# Patient Record
Sex: MALE | Race: WHITE | HISPANIC OR LATINO | Employment: OTHER | URBAN - METROPOLITAN AREA
[De-identification: names, ages, dates, MRNs, and addresses within clinical notes are randomized per-mention and may not be internally consistent; named-entity substitution may affect disease eponyms.]

---

## 2018-11-29 ENCOUNTER — HOSPITAL ENCOUNTER (EMERGENCY)
Facility: MEDICAL CENTER | Age: 58
End: 2018-11-29
Attending: EMERGENCY MEDICINE
Payer: MEDICARE

## 2018-11-29 ENCOUNTER — APPOINTMENT (OUTPATIENT)
Dept: RADIOLOGY | Facility: MEDICAL CENTER | Age: 58
End: 2018-11-29
Payer: MEDICARE

## 2018-11-29 VITALS
SYSTOLIC BLOOD PRESSURE: 90 MMHG | WEIGHT: 162.26 LBS | HEIGHT: 69 IN | BODY MASS INDEX: 24.03 KG/M2 | RESPIRATION RATE: 20 BRPM | TEMPERATURE: 98.7 F | DIASTOLIC BLOOD PRESSURE: 54 MMHG | HEART RATE: 88 BPM | OXYGEN SATURATION: 95 %

## 2018-11-29 DIAGNOSIS — E87.1 HYPONATREMIA: ICD-10-CM

## 2018-11-29 DIAGNOSIS — E87.6 HYPOKALEMIA: ICD-10-CM

## 2018-11-29 DIAGNOSIS — J15.9 COMMUNITY ACQUIRED BACTERIAL PNEUMONIA: ICD-10-CM

## 2018-11-29 DIAGNOSIS — R07.89 CHEST WALL PAIN: ICD-10-CM

## 2018-11-29 LAB
ALBUMIN SERPL BCP-MCNC: 3.1 G/DL (ref 3.2–4.9)
ALBUMIN/GLOB SERPL: 1.1 G/DL
ALP SERPL-CCNC: 60 U/L (ref 30–99)
ALT SERPL-CCNC: 14 U/L (ref 2–50)
ANION GAP SERPL CALC-SCNC: 7 MMOL/L (ref 0–11.9)
AST SERPL-CCNC: 17 U/L (ref 12–45)
BASOPHILS # BLD AUTO: 0 % (ref 0–1.8)
BASOPHILS # BLD: 0 K/UL (ref 0–0.12)
BILIRUB SERPL-MCNC: 1.1 MG/DL (ref 0.1–1.5)
BUN SERPL-MCNC: 23 MG/DL (ref 8–22)
CALCIUM SERPL-MCNC: 8 MG/DL (ref 8.4–10.2)
CHLORIDE SERPL-SCNC: 96 MMOL/L (ref 96–112)
CO2 SERPL-SCNC: 25 MMOL/L (ref 20–33)
CREAT SERPL-MCNC: 1.35 MG/DL (ref 0.5–1.4)
EKG IMPRESSION: NORMAL
EOSINOPHIL # BLD AUTO: 0 K/UL (ref 0–0.51)
EOSINOPHIL NFR BLD: 0 % (ref 0–6.9)
ERYTHROCYTE [DISTWIDTH] IN BLOOD BY AUTOMATED COUNT: 38.6 FL (ref 35.9–50)
GLOBULIN SER CALC-MCNC: 2.8 G/DL (ref 1.9–3.5)
GLUCOSE SERPL-MCNC: 109 MG/DL (ref 65–99)
HCT VFR BLD AUTO: 43.1 % (ref 42–52)
HGB BLD-MCNC: 15.3 G/DL (ref 14–18)
LYMPHOCYTES # BLD AUTO: 1.42 K/UL (ref 1–4.8)
LYMPHOCYTES NFR BLD: 8 % (ref 22–41)
MANUAL DIFF BLD: ABNORMAL
MCH RBC QN AUTO: 33.6 PG (ref 27–33)
MCHC RBC AUTO-ENTMCNC: 35.5 G/DL (ref 33.7–35.3)
MCV RBC AUTO: 94.5 FL (ref 81.4–97.8)
METAMYELOCYTES NFR BLD MANUAL: 4 %
MONOCYTES # BLD AUTO: 0.53 K/UL (ref 0–0.85)
MONOCYTES NFR BLD AUTO: 3 % (ref 0–13.4)
NEUTROPHILS # BLD AUTO: 15.13 K/UL (ref 1.82–7.42)
NEUTROPHILS NFR BLD: 61 % (ref 44–72)
NEUTS BAND NFR BLD MANUAL: 24 % (ref 0–10)
NRBC # BLD AUTO: 0 K/UL
NRBC BLD-RTO: 0 /100 WBC
PLATELET # BLD AUTO: 143 K/UL (ref 164–446)
PLATELET BLD QL SMEAR: NORMAL
PMV BLD AUTO: 10.2 FL (ref 9–12.9)
POTASSIUM SERPL-SCNC: 3.1 MMOL/L (ref 3.6–5.5)
PROT SERPL-MCNC: 5.9 G/DL (ref 6–8.2)
RBC # BLD AUTO: 4.56 M/UL (ref 4.7–6.1)
RBC BLD AUTO: NORMAL
SODIUM SERPL-SCNC: 128 MMOL/L (ref 135–145)
TOXIC GRANULES BLD QL SMEAR: SLIGHT
VARIANT LYMPHS BLD QL SMEAR: NORMAL
WBC # BLD AUTO: 17.8 K/UL (ref 4.8–10.8)

## 2018-11-29 PROCEDURE — 96365 THER/PROPH/DIAG IV INF INIT: CPT

## 2018-11-29 PROCEDURE — 99285 EMERGENCY DEPT VISIT HI MDM: CPT

## 2018-11-29 PROCEDURE — 93005 ELECTROCARDIOGRAM TRACING: CPT | Performed by: EMERGENCY MEDICINE

## 2018-11-29 PROCEDURE — 700111 HCHG RX REV CODE 636 W/ 250 OVERRIDE (IP): Performed by: EMERGENCY MEDICINE

## 2018-11-29 PROCEDURE — 700105 HCHG RX REV CODE 258: Performed by: EMERGENCY MEDICINE

## 2018-11-29 PROCEDURE — 96367 TX/PROPH/DG ADDL SEQ IV INF: CPT

## 2018-11-29 PROCEDURE — 36415 COLL VENOUS BLD VENIPUNCTURE: CPT

## 2018-11-29 PROCEDURE — 80053 COMPREHEN METABOLIC PANEL: CPT

## 2018-11-29 PROCEDURE — 93005 ELECTROCARDIOGRAM TRACING: CPT

## 2018-11-29 PROCEDURE — 85027 COMPLETE CBC AUTOMATED: CPT

## 2018-11-29 PROCEDURE — 71045 X-RAY EXAM CHEST 1 VIEW: CPT

## 2018-11-29 PROCEDURE — 85007 BL SMEAR W/DIFF WBC COUNT: CPT

## 2018-11-29 RX ORDER — DOXYCYCLINE 100 MG/1
100 CAPSULE ORAL 2 TIMES DAILY
Qty: 20 CAP | Refills: 0 | Status: SHIPPED | OUTPATIENT
Start: 2018-11-29 | End: 2018-12-09

## 2018-11-29 RX ORDER — SODIUM CHLORIDE 9 MG/ML
1000 INJECTION, SOLUTION INTRAVENOUS ONCE
Status: COMPLETED | OUTPATIENT
Start: 2018-11-29 | End: 2018-11-29

## 2018-11-29 RX ORDER — POTASSIUM CHLORIDE 1.5 G/1.58G
20 POWDER, FOR SOLUTION ORAL DAILY
Qty: 30 PACKET | Refills: 0 | Status: SHIPPED | OUTPATIENT
Start: 2018-11-29 | End: 2018-12-29

## 2018-11-29 RX ADMIN — CEFTRIAXONE 1 G: 1 INJECTION, POWDER, FOR SOLUTION INTRAMUSCULAR; INTRAVENOUS at 19:51

## 2018-11-29 RX ADMIN — AZITHROMYCIN MONOHYDRATE 500 MG: 500 INJECTION, POWDER, LYOPHILIZED, FOR SOLUTION INTRAVENOUS at 20:39

## 2018-11-29 RX ADMIN — SODIUM CHLORIDE 1000 ML: 9 INJECTION, SOLUTION INTRAVENOUS at 19:51

## 2018-11-29 ASSESSMENT — PAIN SCALES - GENERAL: PAINLEVEL_OUTOF10: 8

## 2018-11-30 NOTE — ED PROVIDER NOTES
ED Provider Note    CHIEF COMPLAINT  Chief Complaint   Patient presents with   • Shortness of Breath     started last NOC   • Rib Pain     Right, denies any recent injuries or illnesses       HPI  HPI  Patient is a 58-year-old male with no past medical history presents emergency room for evaluation of right-sided chest discomfort.  Patient notes that this was on the right lower section of his chest wall, worse with deep inspiration, started last night and appears to have stayed stable during the course of the day.  He feels slightly short of breath though he has no difficulty getting deep breath.  He denies any chest pains or palpitations, no vomiting or nausea.  He has mild associated tactile fevers and chills at this time but denies any other acute illness.      REVIEW OF SYSTEMS  ROS  Constitutional: No recent illness.  Skin: No rashes   ENT: No hearing change. No rhinorrhea or nasal congestion, no ST or difficulty swallowing.  Respiratory: No SOB. No coughing or hemoptysis.  Cardiac: No palpitations, edema. No PND or orthopnea.  GI: No Abdominal Pain; N/V; diarrhea, constipation. No blood in stool.  MSK: No pain in joints or muscles. No calf pain or swelling.  Neuro: No HA or paresthesias.   Endocrine: No polyuria or polydipsia. No heat or cold intolerance.  Heme: No easy bruising. No history of bleeding disorders or anemia.    PAST MEDICAL HISTORY   has a past medical history of Bipolar disorder (HCC); ETOH abuse; Hepatitis C; Heroin use; Schizophrenia (HCC); and Suicidal ideation.    SOCIAL HISTORY  Social History     Social History Main Topics   • Smoking status: Current Every Day Smoker     Packs/day: 1.00     Years: 25.00     Types: Cigarettes   • Smokeless tobacco: Never Used   • Alcohol use Yes   • Drug use: No      Comment: h/o heroin use; quit 8/01/11   • Sexual activity: Not on file     SURGICAL HISTORY   has a past surgical history that includes other orthopedic surgery and other orthopedic  "surgery.    CURRENT MEDICATIONS  Home Medications    **Home medications have not yet been reviewed for this encounter**       ALLERGIES  No Known Allergies    PHYSICAL EXAM  VITAL SIGNS: BP (!) 90/54   Pulse 88   Temp 37.1 °C (98.7 °F) (Temporal)   Resp 20   Ht 1.753 m (5' 9\")   Wt 73.6 kg (162 lb 4.1 oz)   SpO2 95%   BMI 23.96 kg/m²  @[id number]::@  Pulse ox interpretation: I interpret this pulse ox as normal.    Physical Exam  Genl: M sitting in gurney comfortably, speaking clearly, appears in no acute distress   Head: NC/AT   ENT: Mucous membranes moist, posterior pharynx clear, uvula midline, nares patent bilaterally   Eyes: Normal sclera, pupils equal round reactive to light  Neck: Supple, FROM, no LAD appreciated   Pulmonary: Lungs are clear to auscultation bilaterally in upper fields, diminished in right lower lung fields  Chest: No TTP  CV:  RRR, no murmur appreciated, pulses 2+ in both upper and lower extremities,  Abdomen: soft, NT/ND; no rebound/guarding, no masses palpated, no HSM   : no CVA or suprapubic tenderness   Musculoskeletal: Pain free ROM of the neck. Moving upper and lower extremities and spontaneous in coordinated fashion  Neuro: A&Ox4 (person, place, time, situation), speech fluent, gait steady, no focal deficits appreciated, No cerebellar signs  Sensation is grossly intact in the distal upper and lower extremities.  5/5 strength in  and dorsiflexion/plantar flexion of the ankles  Psych: Patient has an appropriate affect and behavior  Skin: No rash or lesions.  No pallor or jaundice.  No cyanosis.  Warm and dry.     COURSE & MEDICAL DECISION MAKING  Pertinent Labs & Imaging studies reviewed. (See chart for details)    DDX:  Pneumonia  COPD exacerbation  Asthma exacerbation  CHF/ACS - less likely  Viral syndrome  Rib injury    MDM    Initial evaluation at 1910:  Patient presented the emergency room for evaluation of cough of acute etiology with primary complaints in the right " lower chest wall.  He was alert and oriented, with his isolated complaint and slightly soft blood pressures on my initial assessment.  He has been told that he has a low blood pressure at baseline but was concerned about the possibility of infectious etiology as described above and possibility of sepsis.  Patient is afebrile and without tachycardia.  Chest x-ray indicates a focal consolidation in the right lower lobe consistent with pneumonia.  Patient received IV fluids after my initial assessment for the possibility of decreased systolic blood pressure 90.  This quickly corrected with only minimal IV fluids and the patient had moderate improvement after receiving a dose of IV Rocephin and azithromycin here in the emergency department.  Patient has no risk factors healthcare acquired pneumonia, does not meet curb 65 recommendations for admission at this time based on his stable vital signs and lack of metabolic changes.  I feel the patient is a good candidate for outpatient therapy.  There is no indication of any acute coronary process in concurrence with his pneumonia.  I discussed the findings, the need for outpatient antibiotic therapy, and the importance of follow-up care.  Verbalized by the patient and his family member.    HYDRATION: Based on the patient's presentation of Hypotension the patient was given IV fluids. IV Hydration was used because oral hydration was not adequate alone. Upon recheck following hydration, the patient was improved.    The patient will not drink alcohol nor drive with prescribed medications. The patient will return for worsening symptoms and is stable at the time of discharge. The patient verbalizes understanding and will comply.      FINAL IMPRESSION  Visit Diagnoses     ICD-10-CM   1. Community acquired bacterial pneumonia J15.9   2. Chest wall pain R07.89   3. Hypokalemia E87.6   4. Hyponatremia E87.1     Electronically signed by: Franco Watkins, 11/29/2018 7:10 PM

## 2018-11-30 NOTE — ED TRIAGE NOTES
"Chief Complaint   Patient presents with   • Shortness of Breath     started last NOC   • Rib Pain     Right, denies any recent injuries or illnesses     BP (!) 90/54   Pulse 86   Temp 36.9 °C (98.5 °F) (Temporal)   Resp 20   Ht 1.753 m (5' 9\")   Wt 73.6 kg (162 lb 4.1 oz)   SpO2 96%   BMI 23.96 kg/m²     Pt currently denies c/o CP or abd pain  "

## 2018-11-30 NOTE — ED NOTES
Report received from Lamin CLOUD.  Assumed patient care. Pt assesement done.  Plan of care reviewed with patient.

## 2018-12-12 ENCOUNTER — HOSPITAL ENCOUNTER (EMERGENCY)
Facility: MEDICAL CENTER | Age: 58
End: 2018-12-12
Attending: EMERGENCY MEDICINE
Payer: MEDICARE

## 2018-12-12 ENCOUNTER — APPOINTMENT (OUTPATIENT)
Dept: RADIOLOGY | Facility: MEDICAL CENTER | Age: 58
End: 2018-12-12
Attending: EMERGENCY MEDICINE
Payer: MEDICARE

## 2018-12-12 VITALS
OXYGEN SATURATION: 90 % | HEIGHT: 69 IN | SYSTOLIC BLOOD PRESSURE: 109 MMHG | BODY MASS INDEX: 24.98 KG/M2 | TEMPERATURE: 99.8 F | DIASTOLIC BLOOD PRESSURE: 60 MMHG | RESPIRATION RATE: 18 BRPM | WEIGHT: 168.65 LBS | HEART RATE: 76 BPM

## 2018-12-12 DIAGNOSIS — R07.89 CHEST WALL PAIN: ICD-10-CM

## 2018-12-12 DIAGNOSIS — J44.1 ACUTE EXACERBATION OF CHRONIC OBSTRUCTIVE PULMONARY DISEASE (COPD) (HCC): ICD-10-CM

## 2018-12-12 LAB
ANION GAP SERPL CALC-SCNC: 6 MMOL/L (ref 0–11.9)
BASOPHILS # BLD AUTO: 0.3 % (ref 0–1.8)
BASOPHILS # BLD: 0.02 K/UL (ref 0–0.12)
BUN SERPL-MCNC: 13 MG/DL (ref 8–22)
CALCIUM SERPL-MCNC: 8.1 MG/DL (ref 8.4–10.2)
CHLORIDE SERPL-SCNC: 104 MMOL/L (ref 96–112)
CO2 SERPL-SCNC: 26 MMOL/L (ref 20–33)
CREAT SERPL-MCNC: 0.93 MG/DL (ref 0.5–1.4)
EOSINOPHIL # BLD AUTO: 0.25 K/UL (ref 0–0.51)
EOSINOPHIL NFR BLD: 3.9 % (ref 0–6.9)
ERYTHROCYTE [DISTWIDTH] IN BLOOD BY AUTOMATED COUNT: 41.1 FL (ref 35.9–50)
GLUCOSE SERPL-MCNC: 105 MG/DL (ref 65–99)
HCT VFR BLD AUTO: 41.2 % (ref 42–52)
HGB BLD-MCNC: 14.1 G/DL (ref 14–18)
IMM GRANULOCYTES # BLD AUTO: 0.02 K/UL (ref 0–0.11)
IMM GRANULOCYTES NFR BLD AUTO: 0.3 % (ref 0–0.9)
LYMPHOCYTES # BLD AUTO: 2.4 K/UL (ref 1–4.8)
LYMPHOCYTES NFR BLD: 37 % (ref 22–41)
MCH RBC QN AUTO: 33.3 PG (ref 27–33)
MCHC RBC AUTO-ENTMCNC: 34.2 G/DL (ref 33.7–35.3)
MCV RBC AUTO: 97.2 FL (ref 81.4–97.8)
MONOCYTES # BLD AUTO: 0.63 K/UL (ref 0–0.85)
MONOCYTES NFR BLD AUTO: 9.7 % (ref 0–13.4)
NEUTROPHILS # BLD AUTO: 3.17 K/UL (ref 1.82–7.42)
NEUTROPHILS NFR BLD: 48.8 % (ref 44–72)
NRBC # BLD AUTO: 0 K/UL
NRBC BLD-RTO: 0 /100 WBC
PLATELET # BLD AUTO: 294 K/UL (ref 164–446)
PMV BLD AUTO: 9.2 FL (ref 9–12.9)
POTASSIUM SERPL-SCNC: 4.1 MMOL/L (ref 3.6–5.5)
RBC # BLD AUTO: 4.24 M/UL (ref 4.7–6.1)
SODIUM SERPL-SCNC: 136 MMOL/L (ref 135–145)
WBC # BLD AUTO: 6.5 K/UL (ref 4.8–10.8)

## 2018-12-12 PROCEDURE — 99284 EMERGENCY DEPT VISIT MOD MDM: CPT

## 2018-12-12 PROCEDURE — 80048 BASIC METABOLIC PNL TOTAL CA: CPT

## 2018-12-12 PROCEDURE — 96375 TX/PRO/DX INJ NEW DRUG ADDON: CPT

## 2018-12-12 PROCEDURE — 96374 THER/PROPH/DIAG INJ IV PUSH: CPT

## 2018-12-12 PROCEDURE — 94640 AIRWAY INHALATION TREATMENT: CPT

## 2018-12-12 PROCEDURE — 700111 HCHG RX REV CODE 636 W/ 250 OVERRIDE (IP): Performed by: EMERGENCY MEDICINE

## 2018-12-12 PROCEDURE — 71046 X-RAY EXAM CHEST 2 VIEWS: CPT

## 2018-12-12 PROCEDURE — 93005 ELECTROCARDIOGRAM TRACING: CPT | Performed by: EMERGENCY MEDICINE

## 2018-12-12 PROCEDURE — 700101 HCHG RX REV CODE 250: Performed by: EMERGENCY MEDICINE

## 2018-12-12 PROCEDURE — 85025 COMPLETE CBC W/AUTO DIFF WBC: CPT

## 2018-12-12 RX ORDER — MORPHINE SULFATE 4 MG/ML
4 INJECTION, SOLUTION INTRAMUSCULAR; INTRAVENOUS ONCE
Status: COMPLETED | OUTPATIENT
Start: 2018-12-12 | End: 2018-12-12

## 2018-12-12 RX ORDER — HYDROCODONE BITARTRATE AND ACETAMINOPHEN 5; 325 MG/1; MG/1
1-2 TABLET ORAL EVERY 6 HOURS PRN
Qty: 30 TAB | Refills: 0 | Status: SHIPPED | OUTPATIENT
Start: 2018-12-12 | End: 2018-12-19

## 2018-12-12 RX ORDER — ALBUTEROL SULFATE 90 UG/1
2 AEROSOL, METERED RESPIRATORY (INHALATION) EVERY 6 HOURS PRN
Qty: 1 INHALER | Refills: 0 | Status: SHIPPED | OUTPATIENT
Start: 2018-12-12

## 2018-12-12 RX ORDER — KETOROLAC TROMETHAMINE 30 MG/ML
30 INJECTION, SOLUTION INTRAMUSCULAR; INTRAVENOUS ONCE
Status: COMPLETED | OUTPATIENT
Start: 2018-12-12 | End: 2018-12-12

## 2018-12-12 RX ORDER — ONDANSETRON 2 MG/ML
4 INJECTION INTRAMUSCULAR; INTRAVENOUS
Status: DISCONTINUED | OUTPATIENT
Start: 2018-12-12 | End: 2018-12-12 | Stop reason: HOSPADM

## 2018-12-12 RX ADMIN — ONDANSETRON 4 MG: 2 INJECTION INTRAMUSCULAR; INTRAVENOUS at 20:20

## 2018-12-12 RX ADMIN — KETOROLAC TROMETHAMINE 30 MG: 30 INJECTION, SOLUTION INTRAMUSCULAR at 20:20

## 2018-12-12 RX ADMIN — ALBUTEROL SULFATE 2.5 MG: 2.5 SOLUTION RESPIRATORY (INHALATION) at 20:29

## 2018-12-12 RX ADMIN — MORPHINE SULFATE 4 MG: 4 INJECTION INTRAVENOUS at 20:20

## 2018-12-12 ASSESSMENT — PAIN SCALES - GENERAL
PAINLEVEL_OUTOF10: 9
PAINLEVEL_OUTOF10: 9

## 2018-12-13 ENCOUNTER — PATIENT OUTREACH (OUTPATIENT)
Dept: HEALTH INFORMATION MANAGEMENT | Facility: OTHER | Age: 58
End: 2018-12-13

## 2018-12-13 LAB — EKG IMPRESSION: NORMAL

## 2018-12-13 NOTE — ED TRIAGE NOTES
Patient BIB sister for SOB and R rib pain for 2 days. Patient was diagnosed with pneumonia 2 weeks ago and he states these symptoms feel the same as then.

## 2018-12-13 NOTE — FLOWSHEET NOTE
12/12/18 2000   Events/Summary/Plan   Events/Summary/Plan SVN   Interdisciplinary Plan of Care-Goals (Indications)   Bronchodilator Indications Strong Subjective / Objective Improvement   Interdisciplinary Plan of Care-Outcomes    Bronchodilator Outcome Patient at Stable Baseline   Education   Education Yes - Pt. / Family has been Instructed in use of Respiratory Equipment;Yes - Pt. / Family has been Instructed in use of Respiratory Medications and Adverse Reactions   RT Assessment of Delivered Medications   Evaluation of Medication Delivery Daily Yes-- Pt /Family has been Instructed in use of Respiratory Medications and Adverse Reactions   SVN Group   #SVN Performed Yes   Given By: Mouthpiece   Date SVN Last Changed 12/12/18   Date SVN Next Change Due (Q 7 Days) 12/19/18   Respiratory WDL   Respiratory (WDL) X   Chest Exam   Respiration 16   Pulse 69   Heart Rate (Monitored) 68   Breath Sounds   Pre/Post Intervention Post Intervention Assessment   RUL Breath Sounds Clear   RML Breath Sounds Diminished;Rhonchi   RLL Breath Sounds Diminished   MEHNAZ Breath Sounds Clear   LLL Breath Sounds Diminished   Oximetry   Continuous Oximetry Yes   Oxygen   Home O2 Use Prior To Admission? No   Pulse Oximetry 95 %   O2 Daily Delivery Respiratory  Room Air with O2 Available

## 2018-12-13 NOTE — ED NOTES
Pt assessed. C/o right rib, shoulder pain with cough and inspiration. Recent pna, now off abx. Denies fever. Will cont to monitor

## 2018-12-13 NOTE — ED PROVIDER NOTES
ED Provider Note    CHIEF COMPLAINT  Chief Complaint   Patient presents with   • Shortness of Breath   • Rib Pain       HPI  Ravnider Loaiza is a 58 y.o. male who presents for right lateral chest pain onset yesterday.  Severe and pleuritic.  Patient was diagnosed with a right lower lobe pneumonia on November 29 completed a course of antibiotics.  His symptoms improved.  He has a 25-pack-year smoking history but denies history of COPD asthma or metered-dose inhaler use.  No diabetes or immune compromise.  He has not had an influenza or pneumonia vaccine.  Feels worse than 2 weeks ago in terms of pain and shortness of breath.  No definite fever.    REVIEW OF SYSTEMS  Pertinent positives include: Right lateral and posterior chest pain, pleuritic, dry cough, shortness of breath.  Pertinent negatives include: Headache, sore throat, rhinorrhea, DVT or PE history, leg swelling, calf pain, vomiting, diarrhea, diabetes, immunocompromise.  10+ systems reviewed and negative.      PAST MEDICAL HISTORY  Past Medical History:   Diagnosis Date   • Bipolar disorder (HCC)    • ETOH abuse    • Hepatitis C    • Heroin use     states quit 06/2011   • Schizophrenia (HCC)    • Suicidal ideation        SOCIAL HISTORY  Social History   Substance Use Topics   • Smoking status: Current Every Day Smoker     Packs/day: 1.00     Years: 25.00     Types: Cigarettes   • Smokeless tobacco: Never Used   • Alcohol use No     History   Drug Use No     Comment: h/o heroin use; quit 8/01/11       SURGICAL HISTORY  Past Surgical History:   Procedure Laterality Date   • OTHER ORTHOPEDIC SURGERY      back   • OTHER ORTHOPEDIC SURGERY      R knee       CURRENT MEDICATIONS    Current Outpatient Prescriptions   Medication Sig Dispense Refill   • potassium chloride (KLOR-CON) 20 MEQ Pack Take 1 Packet by mouth every day for 30 days. 30 Packet 0   • hydrocodone-acetaminophen (VICODIN) 5-500 MG TABS Take 1-2 Tabs by mouth every four hours as needed (pain). 20 Each  "0       ALLERGIES  No Known Allergies    PHYSICAL EXAM  VITAL SIGNS: /83   Pulse 84   Temp 36.9 °C (98.5 °F) (Temporal)   Resp 18   Ht 1.753 m (5' 9\")   Wt 76.5 kg (168 lb 10.4 oz)   SpO2 95%   BMI 24.91 kg/m²   Reviewed and normal  Constitutional: Well developed, Well nourished, quite thin, appears to be in significant pain when he moves.  HENT: Normocephalic, atraumatic, bilateral external ears normal, oropharynx moist, No exudates or erythema.   Eyes: PERRLA, conjunctiva pink, no scleral icterus.   Cardiovascular: Regular S1-S2 without murmur, rub, gallop.  Respiratory: Coarse breath sounds throughout with diminished airflow, moderate lateral chest wall tenderness reproducing symptoms.  Gastrointestinal: Soft, nontender.  Skin: No erythema, no rash.   Genitourinary:  No costovertebral angle tenderness.   Neurologic: Alert & oriented x 3, cranial nerves 2-12 intact by passive exam.  No focal deficit noted.  Psychiatric: Affect normal, Judgment normal, Mood normal.     DIFFERENTIAL DIAGNOSIS:  Pneumonia, rib fracture, pneumothorax, PE less likely.    EKG  EKG Interpretation 8:15 PM    Interpreted by me.  Indication: Chest pain    Rhythm: normal sinus   Rate: Normal at 71  Axis: normal  Ectopy: none  Conduction: normal  ST/T Waves: no acute change  Q Waves: none  R Wave progression: normal  Comparison: Unchanged    Clinical Impression: Normal sinus rhythm  .    RADIOLOGY/PROCEDURES  DX-CHEST-2 VIEWS   Final Result      Linear atelectasis/scarring in the left lower lobe with associated small focal opacity. Favor resolving/recent pneumonia rather than active pneumonia. No pleural effusions.          LABORATORY:  Results for orders placed or performed during the hospital encounter of 12/12/18   CBC WITH DIFFERENTIAL   Result Value Ref Range    WBC 6.5 4.8 - 10.8 K/uL    RBC 4.24 (L) 4.70 - 6.10 M/uL    Hemoglobin 14.1 14.0 - 18.0 g/dL    Hematocrit 41.2 (L) 42.0 - 52.0 %    MCV 97.2 81.4 - 97.8 fL    MCH " 33.3 (H) 27.0 - 33.0 pg    MCHC 34.2 33.7 - 35.3 g/dL    RDW 41.1 35.9 - 50.0 fL    Platelet Count 294 164 - 446 K/uL    MPV 9.2 9.0 - 12.9 fL    Neutrophils-Polys 48.80 44.00 - 72.00 %    Lymphocytes 37.00 22.00 - 41.00 %    Monocytes 9.70 0.00 - 13.40 %    Eosinophils 3.90 0.00 - 6.90 %    Basophils 0.30 0.00 - 1.80 %    Immature Granulocytes 0.30 0.00 - 0.90 %    Nucleated RBC 0.00 /100 WBC    Neutrophils (Absolute) 3.17 1.82 - 7.42 K/uL    Lymphs (Absolute) 2.40 1.00 - 4.80 K/uL    Monos (Absolute) 0.63 0.00 - 0.85 K/uL    Eos (Absolute) 0.25 0.00 - 0.51 K/uL    Baso (Absolute) 0.02 0.00 - 0.12 K/uL    Immature Granulocytes (abs) 0.02 0.00 - 0.11 K/uL    NRBC (Absolute) 0.00 K/uL   BASIC METABOLIC PANEL   Result Value Ref Range    Sodium 136 135 - 145 mmol/L    Potassium 4.1 3.6 - 5.5 mmol/L    Chloride 104 96 - 112 mmol/L    Co2 26 20 - 33 mmol/L    Glucose 105 (H) 65 - 99 mg/dL    Bun 13 8 - 22 mg/dL    Creatinine 0.93 0.50 - 1.40 mg/dL    Calcium 8.1 (L) 8.4 - 10.2 mg/dL    Anion Gap 6.0 0.0 - 11.9       INTERVENTIONS:  Medications   albuterol (PROVENTIL) 2.5mg/0.5ml nebulizer solution 2.5 mg (not administered)   ketorolac (TORADOL) injection 30 mg (not administered)   morphine (pf) 4 mg/ml injection 4 mg (not administered)   ondansetron (ZOFRAN) syringe/vial injection 4 mg (not administered)     Response: Improved coarse rhonchi.    COURSE & MEDICAL DECISION MAKING  Old chart reviewed and patient had thorough workup for his pneumonia, treated with Rocephin and azithromycin and discharged on oral anti-biotics.    This patient presents with right sided reproducible pleuritic chest pain after pneumonia and likely has an intercostal strain, a rib or cartilage fracture or pleuritis.  His pneumonia has resolved.  His pain is so reducible and vitals so normal the pulmonary embolism is very unlikely.  Patient also has COPD and continues to abuse tobacco..    This patient has borderline or elevated blood pressure as  recorded above and was instructed to followup with primary physician for comprehensive blood pressure evaluation and yearly fasting cholesterol assessment according to to CMS protocol.    PLAN:  Discharge Medication List as of 12/12/2018  9:21 PM      START taking these medications    Details   HYDROcodone-acetaminophen (NORCO) 5-325 MG Tab per tablet Take 1-2 Tabs by mouth every 6 hours as needed (mild pain) for up to 7 days., Disp-30 Tab, R-0, Print Rx Paper, For 7 days      albuterol 108 (90 Base) MCG/ACT Aero Soln inhalation aerosol Inhale 2 Puffs by mouth every 6 hours as needed for Shortness of Breath. You may use it up to every 4 hours but should see a physician if you need it more often than every 4 hours., Disp-1 Inhaler, R-0, Print Rx Paper           Prescription monitoring queried and score 0  Opiate risk tool utilized and patient low risk  Informed consent obtained for opiate analgesic  Indication opiate analgesic subjective chest wall pain    Discontinue tobacco use  COPD handout given  Return for worsening shortness of breath, leg swelling, fever, ill appearance    [facility] [address] [address]  [phone]  Schedule an appointment as soon as possible for a visit   As needed if not better one week      CONDITION: Stable, improved.    FINAL IMPRESSION  1. Chest wall pain    2. Acute exacerbation of chronic obstructive pulmonary disease (COPD) (HCC)    3.      Tobacco abuse  4.      Resolved pneumonia    Electronically signed by: Jay Gilliam, 12/12/2018 8:13 PM

## 2018-12-13 NOTE — LETTER
Ravinder Loaiza  [address] [place] [address]    December 13, 2018      Dear Ravinder Loaiza,    [facility], [facility] wants to ensure your discharge home is safe and you or your loved ones have had all of your questions answered regarding your care after you leave the hospital.    Our discharge team was unsuccessful in our attempts to contact you telephonically and we wanted to be sure that you had a list of resources and contact information should you have any questions regarding your hospital stay, follow-up instructions, or active medical symptoms.    Questions or Concerns Regarding… Contact   Medical Questions Related to Your Discharge  (7 days a week, 8am-5pm) Contact a Nurse Care Coordinator   [phone]   Medical Questions Not Related to Your Discharge  (24 hours a day / 7 days a week)  Contact the Nurse Health Line   [phone]    Medications or Discharge Instructions Refer to your discharge packet   or contact your -[phone]   Follow-up Appointment(s) Schedule your appointment via Eda[employer]   or contact Scheduling [phone]   Billing Review your statement via Edai  or contact Billing [phone]   Medical Records Review your records via Edai   or contact Medical Records [phone]     You can also easily access your medical information, test results and upcoming appointments via the Edai free online health management tool. You can learn more and sign up at [employer]/Edai. For assistance setting up your Edai account, please call [phone].    Once again, we want to ensure your discharge home is safe and that you have a clear understanding of any next steps in your care. If you have any questions or concerns, please do not hesitate to contact us, we are here for you. Thank you for choosing [facility] for your healthcare needs.    Sincerely,      Your [facility] Healthcare Team

## 2018-12-13 NOTE — DISCHARGE INSTRUCTIONS
Chronic Obstructive Pulmonary Disease  (COPD)    Use albuterol 4 times a day for cough or shortness of breath.  Stop smoking tobacco.  Take ibuprofen 800 mg 4 times a day for 5-10 days unless it upsets the stomach.  Add hydrocodone for more severe pain.  See your doctor if not better in a week.  Return for worsening shortness of breath, leg swelling, fever or ill appearance.  Return also for ill appearance or worsening shortness of breath.   You had an elevated or high normal blood pressure reading today.  This does not necessarily mean you have hypertension.  Please followup with your/a primary physician for comprehensive blood pressure evaluation.  BP Readings from Last 3 Encounters:   12/12/18 119/83   11/29/18 (!) 90/54   05/14/12 134/96   .    Your physician has diagnosed you as having chronic obstructive pulmonary disease (COPD). This is a process in which the air flow is restricted from leaving the lungs. It is also an end stage process of previous damage with the most common cause being smoking. It is essentially irreversible and treatment is mainly supportive. The lungs can never return to normal, but there are measures you can take which will improve them and make you feel better.    HOME CARE INSTRUCTIONS  If you smoke, STOP SMOKING. The carbon monoxide build-up in the blood robs you of your already short oxygen supply.  If antibiotics were prescribed, take as directed.  Avoid antihistamines and cough syrups. (They dry your system up and slow down the elimination of secretions. This decreases respiratory capacity and may lead to infections.)  Drink 8 large glasses of fluids per day. (This loosens secretions.)  Use humidifiers in home and at bedside if they do not make breathing difficult.  Receive all protective vaccines your caregiver suggests, especially pneumococcal and influenza.  Use home oxygen as suggested once started.    SEEK MEDICAL ATTENTION IF:  Sputum becomes purulent (infected looking).  An  oral temperature that lasts 2 or more days above 102° F (38.9° C) or as your caregiver suggests, and is not controlled by medication.  Breathing is more labored or exercise tolerance is decreasing.  You are running out of medicine you take for your breathing.    SEEK IMMEDIATE MEDICAL CARE IF:  You have rapid heart rate, agitation, confusion or stupor (family members may need to observe this), or tremors.  Any time it becomes difficult to breathe.  You develop chest pain.    Document Released: 09/27/2006  Document Re-Released: 10/04/2007  [employer]® Patient Information ©2008 [employer], [employer].

## 2018-12-13 NOTE — ED NOTES
Discharge information provided. Pt verbalized understanding of discharge instructions to follow up with PCP and to return to ER if condition worsens. Pt expressed the awareness of not driving or operating heavy machinery, has ride home with Wife. Pt ambulated out of ER in a steady gait, no additional questions or concerns. Educated on new medications and spacer use

## 2018-12-14 NOTE — PROGRESS NOTES
Placed discharge outreach phone call to pt s/p ER discharge 12/12/18.  Received recording stating that pt's voice mailbox is full.  Discharge outreach letter mailed to pt.

## 2019-01-31 ENCOUNTER — HOSPITAL ENCOUNTER (EMERGENCY)
Facility: MEDICAL CENTER | Age: 59
End: 2019-01-31
Payer: MEDICARE

## 2019-01-31 VITALS
RESPIRATION RATE: 20 BRPM | HEART RATE: 87 BPM | BODY MASS INDEX: 24.91 KG/M2 | HEIGHT: 69 IN | TEMPERATURE: 97.8 F | DIASTOLIC BLOOD PRESSURE: 72 MMHG | OXYGEN SATURATION: 98 % | SYSTOLIC BLOOD PRESSURE: 119 MMHG

## 2019-01-31 PROCEDURE — 302449 STATCHG TRIAGE ONLY (STATISTIC)

## 2022-01-31 ENCOUNTER — HOSPITAL ENCOUNTER (EMERGENCY)
Facility: MEDICAL CENTER | Age: 62
End: 2022-02-01
Attending: EMERGENCY MEDICINE
Payer: MEDICARE

## 2022-01-31 ENCOUNTER — APPOINTMENT (OUTPATIENT)
Dept: RADIOLOGY | Facility: MEDICAL CENTER | Age: 62
End: 2022-01-31
Attending: EMERGENCY MEDICINE
Payer: MEDICARE

## 2022-01-31 DIAGNOSIS — R41.82 ALTERED MENTAL STATUS, UNSPECIFIED ALTERED MENTAL STATUS TYPE: ICD-10-CM

## 2022-01-31 DIAGNOSIS — F10.929 ALCOHOLIC INTOXICATION WITH COMPLICATION (HCC): ICD-10-CM

## 2022-01-31 DIAGNOSIS — U07.1 COVID-19: ICD-10-CM

## 2022-01-31 LAB
BASOPHILS # BLD AUTO: 0.3 % (ref 0–1.8)
BASOPHILS # BLD: 0.02 K/UL (ref 0–0.12)
EOSINOPHIL # BLD AUTO: 0.25 K/UL (ref 0–0.51)
EOSINOPHIL NFR BLD: 4.1 % (ref 0–6.9)
ERYTHROCYTE [DISTWIDTH] IN BLOOD BY AUTOMATED COUNT: 42.8 FL (ref 35.9–50)
HCT VFR BLD AUTO: 42.9 % (ref 42–52)
HGB BLD-MCNC: 14.8 G/DL (ref 14–18)
IMM GRANULOCYTES # BLD AUTO: 0.04 K/UL (ref 0–0.11)
IMM GRANULOCYTES NFR BLD AUTO: 0.7 % (ref 0–0.9)
LYMPHOCYTES # BLD AUTO: 2.58 K/UL (ref 1–4.8)
LYMPHOCYTES NFR BLD: 42.5 % (ref 22–41)
MCH RBC QN AUTO: 33.5 PG (ref 27–33)
MCHC RBC AUTO-ENTMCNC: 34.5 G/DL (ref 33.7–35.3)
MCV RBC AUTO: 97.1 FL (ref 81.4–97.8)
MONOCYTES # BLD AUTO: 0.63 K/UL (ref 0–0.85)
MONOCYTES NFR BLD AUTO: 10.4 % (ref 0–13.4)
NEUTROPHILS # BLD AUTO: 2.55 K/UL (ref 1.82–7.42)
NEUTROPHILS NFR BLD: 42 % (ref 44–72)
NRBC # BLD AUTO: 0 K/UL
NRBC BLD-RTO: 0 /100 WBC
PLATELET # BLD AUTO: 257 K/UL (ref 164–446)
PMV BLD AUTO: 9.7 FL (ref 9–12.9)
RBC # BLD AUTO: 4.42 M/UL (ref 4.7–6.1)
WBC # BLD AUTO: 6.1 K/UL (ref 4.8–10.8)

## 2022-01-31 PROCEDURE — 700111 HCHG RX REV CODE 636 W/ 250 OVERRIDE (IP): Performed by: EMERGENCY MEDICINE

## 2022-01-31 PROCEDURE — 80053 COMPREHEN METABOLIC PANEL: CPT

## 2022-01-31 PROCEDURE — 82077 ASSAY SPEC XCP UR&BREATH IA: CPT

## 2022-01-31 PROCEDURE — 85025 COMPLETE CBC W/AUTO DIFF WBC: CPT

## 2022-01-31 PROCEDURE — 36415 COLL VENOUS BLD VENIPUNCTURE: CPT

## 2022-01-31 PROCEDURE — 99285 EMERGENCY DEPT VISIT HI MDM: CPT

## 2022-01-31 PROCEDURE — 93005 ELECTROCARDIOGRAM TRACING: CPT | Performed by: EMERGENCY MEDICINE

## 2022-01-31 PROCEDURE — 84484 ASSAY OF TROPONIN QUANT: CPT

## 2022-01-31 PROCEDURE — 96372 THER/PROPH/DIAG INJ SC/IM: CPT

## 2022-01-31 RX ORDER — LORAZEPAM 2 MG/ML
1 INJECTION INTRAMUSCULAR ONCE
Status: COMPLETED | OUTPATIENT
Start: 2022-01-31 | End: 2022-01-31

## 2022-01-31 RX ORDER — HALOPERIDOL 5 MG/ML
5 INJECTION INTRAMUSCULAR ONCE
Status: COMPLETED | OUTPATIENT
Start: 2022-01-31 | End: 2022-01-31

## 2022-01-31 RX ADMIN — LORAZEPAM 1 MG: 2 INJECTION INTRAMUSCULAR; INTRAVENOUS at 23:15

## 2022-01-31 RX ADMIN — HALOPERIDOL LACTATE 5 MG: 5 INJECTION, SOLUTION INTRAMUSCULAR at 23:14

## 2022-01-31 ASSESSMENT — FIBROSIS 4 INDEX: FIB4 SCORE: 0.96

## 2022-02-01 ENCOUNTER — APPOINTMENT (OUTPATIENT)
Dept: RADIOLOGY | Facility: MEDICAL CENTER | Age: 62
End: 2022-02-01
Attending: EMERGENCY MEDICINE
Payer: MEDICARE

## 2022-02-01 VITALS
OXYGEN SATURATION: 100 % | SYSTOLIC BLOOD PRESSURE: 104 MMHG | RESPIRATION RATE: 18 BRPM | WEIGHT: 168 LBS | BODY MASS INDEX: 24.81 KG/M2 | HEART RATE: 77 BPM | DIASTOLIC BLOOD PRESSURE: 67 MMHG | TEMPERATURE: 97.3 F

## 2022-02-01 LAB
ALBUMIN SERPL BCP-MCNC: 4.1 G/DL (ref 3.2–4.9)
ALBUMIN/GLOB SERPL: 1.6 G/DL
ALP SERPL-CCNC: 119 U/L (ref 30–99)
ALT SERPL-CCNC: 30 U/L (ref 2–50)
ANION GAP SERPL CALC-SCNC: 14 MMOL/L (ref 7–16)
AST SERPL-CCNC: 23 U/L (ref 12–45)
BILIRUB SERPL-MCNC: <0.2 MG/DL (ref 0.1–1.5)
BUN SERPL-MCNC: 14 MG/DL (ref 8–22)
CALCIUM SERPL-MCNC: 8.2 MG/DL (ref 8.4–10.2)
CHLORIDE SERPL-SCNC: 111 MMOL/L (ref 96–112)
CO2 SERPL-SCNC: 19 MMOL/L (ref 20–33)
CREAT SERPL-MCNC: 0.68 MG/DL (ref 0.5–1.4)
EKG IMPRESSION: NORMAL
ETHANOL BLD-MCNC: 189.6 MG/DL (ref 0–10)
FLUAV RNA SPEC QL NAA+PROBE: NEGATIVE
FLUBV RNA SPEC QL NAA+PROBE: NEGATIVE
GLOBULIN SER CALC-MCNC: 2.6 G/DL (ref 1.9–3.5)
GLUCOSE SERPL-MCNC: 107 MG/DL (ref 65–99)
POTASSIUM SERPL-SCNC: 3.8 MMOL/L (ref 3.6–5.5)
PROT SERPL-MCNC: 6.7 G/DL (ref 6–8.2)
RSV RNA SPEC QL NAA+PROBE: NEGATIVE
SARS-COV-2 RNA RESP QL NAA+PROBE: DETECTED
SODIUM SERPL-SCNC: 144 MMOL/L (ref 135–145)
SPECIMEN SOURCE: ABNORMAL
TROPONIN T SERPL-MCNC: <6 NG/L (ref 6–19)

## 2022-02-01 PROCEDURE — 71045 X-RAY EXAM CHEST 1 VIEW: CPT

## 2022-02-01 PROCEDURE — C9803 HOPD COVID-19 SPEC COLLECT: HCPCS | Performed by: EMERGENCY MEDICINE

## 2022-02-01 PROCEDURE — 0241U HCHG SARS-COV-2 COVID-19 NFCT DS RESP RNA 4 TRGT MIC: CPT

## 2022-02-01 NOTE — ED NOTES
Security at bedside to adjust the patient arms in restraints. Pt slightly agitated upon awakening and trying to leave. Restraints continued for continued agitation.

## 2022-02-01 NOTE — ED NOTES
Spoke with patient's sister Nicolasa to update her on patient's discharge. She reports she will come and  the patient.

## 2022-02-01 NOTE — ED PROVIDER NOTES
ED Provider Note    Patient:SEVEN Castellon  Patient : 1960  Patient MRN: [id number]  Patient PCP: Pcp Pt States None    Admit Date: 2022  Discharge Date and Time: 22 6:28 AM  Discharge Diagnosis: COVID-19, alcohol intoxication  Discharge Attending: Von Singh M.D.  Discharge Service: ED Observation    ED Course  SEVEN Castellon is a 61 y.o. male who was evaluated at [facility] for complaints of chest pain as well as URI symptoms.  Known exposure to COVID-19.  He is not vaccinated.  He was evaluated last night.  Identified to have COVID-19 with reassuring cardiac evaluation.  He was intoxicated and became agitated and combative.  He required chemical and physical restraints for his protection and that of others.  He was allowed to sober up overnight.  I reassessed the patient this morning.  He is calm cooperative.  He is not suicidal.  He does not remember what happened last night and he is apologetic for what happened.  He was informed of his Covid diagnosis.  He is not interested in any additional treatment.  He lives with his sister and reports they had already had Covid.  His vital signs are stable.  He does not have any findings of decompensation of his Covid.  He will be discharged to follow-up with his primary provider.  I have advised him to return to the ER if he has difficulty breathing, worsening symptoms, not improving or concern    Discharge Exam:  /67   Pulse 77   Temp 36.3 °C (97.3 °F) (Temporal)   Resp 18   Wt 76.2 kg (168 lb)   SpO2 100%   BMI 24.81 kg/m² .    Constitutional: Awake and alert. Nontoxic  HENT: Nasal congestion noted  Eyes: Grossly normal  Neck: Normal range of motion  Cardiovascular: Normal heart rate   Thorax & Lungs: No respiratory distress  Abdomen: Nontender  Skin:  No pathologic rash.   Extremities: Well perfused  Psychiatric: Affect normal.  Pleasant cooperative    Labs  Results for orders placed or performed during the hospital  encounter of 01/31/22   CBC with Differential   Result Value Ref Range    WBC 6.1 4.8 - 10.8 K/uL    RBC 4.42 (L) 4.70 - 6.10 M/uL    Hemoglobin 14.8 14.0 - 18.0 g/dL    Hematocrit 42.9 42.0 - 52.0 %    MCV 97.1 81.4 - 97.8 fL    MCH 33.5 (H) 27.0 - 33.0 pg    MCHC 34.5 33.7 - 35.3 g/dL    RDW 42.8 35.9 - 50.0 fL    Platelet Count 257 164 - 446 K/uL    MPV 9.7 9.0 - 12.9 fL    Neutrophils-Polys 42.00 (L) 44.00 - 72.00 %    Lymphocytes 42.50 (H) 22.00 - 41.00 %    Monocytes 10.40 0.00 - 13.40 %    Eosinophils 4.10 0.00 - 6.90 %    Basophils 0.30 0.00 - 1.80 %    Immature Granulocytes 0.70 0.00 - 0.90 %    Nucleated RBC 0.00 /100 WBC    Neutrophils (Absolute) 2.55 1.82 - 7.42 K/uL    Lymphs (Absolute) 2.58 1.00 - 4.80 K/uL    Monos (Absolute) 0.63 0.00 - 0.85 K/uL    Eos (Absolute) 0.25 0.00 - 0.51 K/uL    Baso (Absolute) 0.02 0.00 - 0.12 K/uL    Immature Granulocytes (abs) 0.04 0.00 - 0.11 K/uL    NRBC (Absolute) 0.00 K/uL   Complete Metabolic Panel (CMP)   Result Value Ref Range    Sodium 144 135 - 145 mmol/L    Potassium 3.8 3.6 - 5.5 mmol/L    Chloride 111 96 - 112 mmol/L    Co2 19 (L) 20 - 33 mmol/L    Anion Gap 14.0 7.0 - 16.0    Glucose 107 (H) 65 - 99 mg/dL    Bun 14 8 - 22 mg/dL    Creatinine 0.68 0.50 - 1.40 mg/dL    Calcium 8.2 (L) 8.4 - 10.2 mg/dL    AST(SGOT) 23 12 - 45 U/L    ALT(SGPT) 30 2 - 50 U/L    Alkaline Phosphatase 119 (H) 30 - 99 U/L    Total Bilirubin <0.2 0.1 - 1.5 mg/dL    Albumin 4.1 3.2 - 4.9 g/dL    Total Protein 6.7 6.0 - 8.2 g/dL    Globulin 2.6 1.9 - 3.5 g/dL    A-G Ratio 1.6 g/dL   Troponin   Result Value Ref Range    Troponin T <6 6 - 19 ng/L   ETHYL ALCOHOL (BLOOD)   Result Value Ref Range    Diagnostic Alcohol 189.6 (H) 0.0 - 10.0 mg/dL   ESTIMATED GFR   Result Value Ref Range    GFR If African American >60 >60 mL/min/1.73 m 2    GFR If Non African American >60 >60 mL/min/1.73 m 2   COV-2, FLU A/B, AND RSV BY PCR (2-4 HOURS [employer]): Collect NP swab in VTM    Specimen: Respirate    Result Value Ref Range    Influenza virus A RNA Negative Negative    Influenza virus B, PCR Negative Negative    RSV, PCR Negative Negative    SARS-CoV-2 by PCR DETECTED (AA)     SARS-CoV-2 Source NP Swab    EKG   Result Value Ref Range    Report       [facility] Emergency Dept.    Test Date:  2022  Pt Name:    SEVEN BARRETT            Department: [facility]  MRN:        [id number]                      [facility]  Gender:     Male                         Technician: DANA  :        1960                   Requested By:ER TRIAGE PROTOCOL  Order #:    [id number]                    Reading MD: JENIFER WHEELER MD    Measurements  Intervals                                Axis  Rate:       86                           P:          9  UT:         176                          QRS:        84  QRSD:       97                           T:          57  QT:         375  QTc:        449    Interpretive Statements  Sinus rhythm  Compared to ECG 2018 19:25:42  No significant changes  Electronically Signed On 2022 0:35:56 PST by JENIFER WHEELER MD         Radiology  DX-CHEST-PORTABLE (1 VIEW)   Final Result         1.  Left atelectasis or early infiltrate.          Disposition: Home    Discharge Condition: Stable    Electronically signed by: Von Singh M.D., 2022 6:28 AM

## 2022-02-01 NOTE — ED NOTES
2245: pt arrived via [facility] and became aggressive with staff. Security notified at arrived at bedside. The pt was restrained by security and moved onto [facility]. Pt calmed down and agreed to be cooperative with staff.   2250; Dr Craig to bedside to assess patient.   2300: This RN attempted to insert IV, obtain vitals, and get and EKG. Pt became aggressing and violent with staff. RN and tach attempted to calm patient down. Security notified and patient was placed in 4 point hard restraints per Dr Craig orders. IM Haldol and Ativan given per Dr Craig.   2315: Pts sister arrived at bedside and tried to calm pt down. Updated sister and patient on POC.   2350: Pt is calm and sleeping with sister at bedside. EKG obtained, IV placed and labs collected.

## 2022-02-01 NOTE — ED PROVIDER NOTES
"ED Provider Note    Scribed for Dr. Sarkis Craig M.D. by Pako Sky. 1/31/2022  10:55 PM    Primary care provider: Pcp Pt States None  Means of arrival: EMS  History obtained from: Patient  History limited by: Intoxication    CHIEF COMPLAINT  Chief Complaint   Patient presents with   • Chest Pain     pt report the chest pain started this afternoon. He reports \"it felt like an elephant was sitting on my chest\"   • Alcohol Intoxication     pt report he drank '2 shots of Rum\"       HPI  SEVEN Castellon is a 61 y.o. male who presents to the Emergency Department via EMS for evaluation of an episode of chest pain occurring this afternoon. Per nursing, the patient described the chest pain as \"An elephant sitting on my chest\". He reports that his chest pain has resolved upon arriving to the ED according to his sister who arrived later he had an episode in which he was complaining of shortness of breath and banging on the floor.  He affirms associated cough, but denies any fever. He notes that he does not have any prior history of similar chest pain. The patient states he had a face-to-face interaction with a friend who had recently tested positive for COVID-19. The patient reports smoking two packs of cigarettes per day. HPI Limited secondary to Intoxication.    REVIEW OF SYSTEMS  Pertinent positives include crushing chest pain and cough.   Pertinent negatives include no fevers.  See HPI for further details.   ROS Limited secondary to Intoxication.    PAST MEDICAL HISTORY   has a past medical history of Bipolar disorder (HCC), ETOH abuse, Hepatitis C, Heroin use, Schizophrenia (HCC), and Suicidal ideation.    SURGICAL HISTORY   has a past surgical history that includes other orthopedic surgery and other orthopedic surgery.    SOCIAL HISTORY  Social History     Tobacco Use   • Smoking status: Current Every Day Smoker     Packs/day: 1.00     Years: 25.00     Pack years: 25.00     Types: Cigarettes   • Smokeless " tobacco: Never Used   Substance Use Topics   • Alcohol use: No   • Drug use: No     Types: Intravenous     Comment: h/o heroin use; quit 8/01/11      Social History     Substance and Sexual Activity   Drug Use No   • Types: Intravenous    Comment: h/o heroin use; quit 8/01/11       FAMILY HISTORY  Cannot be obtained uncooperative    CURRENT MEDICATIONS  Home Medications    **Home medications have not yet been reviewed for this encounter**         ALLERGIES  No Known Allergies    PHYSICAL EXAM  VITAL SIGNS: /75   Pulse 78   SpO2 89%     Constitutional: Appears intoxicated, Well developed, Well nourished, initially cooperative but then became agitated.  Yelling loudly no respiratory distress  HENT: Normocephalic, Atraumatic, Bilateral external ears normal, Oropharynx moist, No oral exudates.   Eyes: PERRLA, EOMI, Conjunctiva normal, No discharge.   Neck: No tenderness, Supple, No stridor.   Lymphatic: No lymphadenopathy noted.   Cardiovascular: Normal heart rate, Normal rhythm.   Thorax & Lungs: Mild chest tenderness, Clear to auscultation bilaterally, difficult to auscultate and assess completely as he is yelling and uncooperative appears clear later  ,  .   Abdomen: Soft, No tenderness, No masses, No pulsatile masses.   Skin: Warm, Dry, No erythema, No rash.   Extremities:, No edema No cyanosis.   Musculoskeletal: No major deformities noted.  Intact distal pulses  Neurologic: Awake, alert. Moves all extremities spontaneously.     LABS  Results for orders placed or performed during the hospital encounter of 01/31/22   CBC with Differential   Result Value Ref Range    WBC 6.1 4.8 - 10.8 K/uL    RBC 4.42 (L) 4.70 - 6.10 M/uL    Hemoglobin 14.8 14.0 - 18.0 g/dL    Hematocrit 42.9 42.0 - 52.0 %    MCV 97.1 81.4 - 97.8 fL    MCH 33.5 (H) 27.0 - 33.0 pg    MCHC 34.5 33.7 - 35.3 g/dL    RDW 42.8 35.9 - 50.0 fL    Platelet Count 257 164 - 446 K/uL    MPV 9.7 9.0 - 12.9 fL    Neutrophils-Polys 42.00 (L) 44.00 - 72.00  %    Lymphocytes 42.50 (H) 22.00 - 41.00 %    Monocytes 10.40 0.00 - 13.40 %    Eosinophils 4.10 0.00 - 6.90 %    Basophils 0.30 0.00 - 1.80 %    Immature Granulocytes 0.70 0.00 - 0.90 %    Nucleated RBC 0.00 /100 WBC    Neutrophils (Absolute) 2.55 1.82 - 7.42 K/uL    Lymphs (Absolute) 2.58 1.00 - 4.80 K/uL    Monos (Absolute) 0.63 0.00 - 0.85 K/uL    Eos (Absolute) 0.25 0.00 - 0.51 K/uL    Baso (Absolute) 0.02 0.00 - 0.12 K/uL    Immature Granulocytes (abs) 0.04 0.00 - 0.11 K/uL    NRBC (Absolute) 0.00 K/uL   Complete Metabolic Panel (CMP)   Result Value Ref Range    Sodium 144 135 - 145 mmol/L    Potassium 3.8 3.6 - 5.5 mmol/L    Chloride 111 96 - 112 mmol/L    Co2 19 (L) 20 - 33 mmol/L    Anion Gap 14.0 7.0 - 16.0    Glucose 107 (H) 65 - 99 mg/dL    Bun 14 8 - 22 mg/dL    Creatinine 0.68 0.50 - 1.40 mg/dL    Calcium 8.2 (L) 8.4 - 10.2 mg/dL    AST(SGOT) 23 12 - 45 U/L    ALT(SGPT) 30 2 - 50 U/L    Alkaline Phosphatase 119 (H) 30 - 99 U/L    Total Bilirubin <0.2 0.1 - 1.5 mg/dL    Albumin 4.1 3.2 - 4.9 g/dL    Total Protein 6.7 6.0 - 8.2 g/dL    Globulin 2.6 1.9 - 3.5 g/dL    A-G Ratio 1.6 g/dL   Troponin   Result Value Ref Range    Troponin T <6 6 - 19 ng/L   ETHYL ALCOHOL (BLOOD)   Result Value Ref Range    Diagnostic Alcohol 189.6 (H) 0.0 - 10.0 mg/dL   ESTIMATED GFR   Result Value Ref Range    GFR If African American >60 >60 mL/min/1.73 m 2    GFR If Non African American >60 >60 mL/min/1.73 m 2   EKG   Result Value Ref Range    Report       [facility] Emergency Dept.    Test Date:  2022  Pt Name:    SEVEN BARRETT            Department: [facility]  MRN:        [id number]                      Room:       -ROOM 1  Gender:     Male                         Technician: DANA  :        1960                   Requested By:ER TRIAGE PROTOCOL  Order #:    [id number]                    Reading MD: JENIFER WHEELER MD    Measurements  Intervals                                Axis  Rate:        86                           P:          9  MN:         176                          QRS:        84  QRSD:       97                           T:          57  QT:         375  QTc:        449    Interpretive Statements  Sinus rhythm  Compared to ECG 12/12/2018 19:25:42  No significant changes  Electronically Signed On 2-1-2022 0:35:56 PST by JENIFER WHEELER MD        All labs reviewed by me.    EKG  Interpreted by me, as described below.    RADIOLOGY  DX-CHEST-PORTABLE (1 VIEW)    (Results Pending)     The radiologist's interpretation of all radiological studies have been reviewed by me.    COURSE & MEDICAL DECISION MAKING  Pertinent Labs & Imaging studies reviewed. (See chart for details)    10:55 PM - Patient seen and examined at bedside. Ordered DX-Chest, CBC w/diff, CMP, Troponin, and EKG to evaluate his symptoms. The differential diagnoses include but are not limited to: Acute coronary syndrome, Covid, COPD exacerbation, chest wall pain    11:06 PM - Patient yelling, called over to bedside. He will be put in restraints by security at bedside.    11:42 PM - Patient seen at bedside.    Decision Making:  This is a   61-year-old male brought in by ambulance after an episode of chest pain.  He had stated this completely resolved before he was seen.  He became agitated and aggressive in the emergency room required restraints and was treated with Haldol and Ativan.  Prior to this he I was able to examine him he did have some mild chest wall tenderness but otherwise normal and was no longer complaining of any pain.  He did have a chest pain weight work-up instituted including EKG which appears normal to me normal labs including troponin.  Chest x-ray still pending at this time.  The patient is quite sedated from the Haldol and Ativan and I cannot able to wake him up at this point I will need past care on to another ER physician at this point until this sedation wears off  Patient will be admitted to ED observation at  this point 12:50 AM on 2/1/2022  FINAL IMPRESSION  1. Chest pain, unspecified type          I, Pako Sky (Scribe), am scribing for, and in the presence of, Sarkis Craig M.D..    Electronically signed by: Pako Sky (Scribkeegan), 1/31/2022    Sarkis BARDALES M.D. personally performed the services described in this documentation, as scribed by Pako Sky in my presence, and it is both accurate and complete. C.    The note accurately reflects work and decisions made by me.  Sarkis Craig M.D.  2/1/2022  12:38 AM

## 2023-08-14 ENCOUNTER — APPOINTMENT (OUTPATIENT)
Dept: URGENT CARE | Facility: CLINIC | Age: 63
End: 2023-08-14